# Patient Record
Sex: FEMALE | URBAN - METROPOLITAN AREA
[De-identification: names, ages, dates, MRNs, and addresses within clinical notes are randomized per-mention and may not be internally consistent; named-entity substitution may affect disease eponyms.]

---

## 2020-09-29 ENCOUNTER — OFFICE VISIT (OUTPATIENT)
Dept: URBAN - METROPOLITAN AREA CLINIC 113 | Facility: CLINIC | Age: 85
End: 2020-09-29

## 2020-10-06 ENCOUNTER — OFFICE VISIT (OUTPATIENT)
Dept: URBAN - METROPOLITAN AREA CLINIC 113 | Facility: CLINIC | Age: 85
End: 2020-10-06
Payer: MEDICARE

## 2020-10-06 ENCOUNTER — DASHBOARD ENCOUNTERS (OUTPATIENT)
Age: 85
End: 2020-10-06

## 2020-10-06 VITALS
DIASTOLIC BLOOD PRESSURE: 104 MMHG | TEMPERATURE: 98 F | WEIGHT: 162 LBS | HEART RATE: 86 BPM | SYSTOLIC BLOOD PRESSURE: 184 MMHG | HEIGHT: 66 IN | BODY MASS INDEX: 26.03 KG/M2 | RESPIRATION RATE: 18 BRPM

## 2020-10-06 DIAGNOSIS — R76.8 POSITIVE HEPATITIS C ANTIBODY TEST: ICD-10-CM

## 2020-10-06 PROCEDURE — G8427 DOCREV CUR MEDS BY ELIG CLIN: HCPCS | Performed by: NURSE PRACTITIONER

## 2020-10-06 PROCEDURE — 99203 OFFICE O/P NEW LOW 30 MIN: CPT | Performed by: NURSE PRACTITIONER

## 2020-10-06 RX ORDER — ISOSORBIDE DINITRATE 5 MG/1
1 TABLET TABLET ORAL
Status: ACTIVE | COMMUNITY

## 2020-10-06 RX ORDER — ACETAMINOPHEN 325 MG/1
1 TABLET AS NEEDED TABLET, FILM COATED ORAL
Status: ACTIVE | COMMUNITY

## 2020-10-06 RX ORDER — FUROSEMIDE 80 MG/1
1 TABLET TABLET ORAL ONCE A DAY
Status: ACTIVE | COMMUNITY

## 2020-10-06 RX ORDER — CARVEDILOL 6.25 MG/1
1 TABLET WITH FOOD TABLET, FILM COATED ORAL TWICE A DAY
Status: ACTIVE | COMMUNITY

## 2020-10-06 NOTE — HPI-TODAY'S VISIT:
This is an 85-year-old female with a history of hypertension, diabetes, chronic kidney disease previously on hemodialysis, peripheral vascular disease with chronic ulcers presenting for evaluation of hepatitis C.  She went to the emergency department at [facility] 3 weeks ago.  She states she "felt bad in my head all over."  Her blood pressure was 200.  She declined medication and was discharged.  She was later called and informed that her labs were positive for hepatitis C.  She denies a history of liver enzyme elevation.  She had a blood transfusion in 1969 for a hemoglobin of 5 at the time of a tubal pregnancy requiring hysterectomy.  She occasionally drinks a tablespoon of amaretto in her coffee.  She has a bowel movement every day.  Occasionally, she has to strain and takes mineral oil.  She denies any other abdominal symptoms.  She has labs scheduled with her nephrologist, Dr. Umesh Reaves in [place].  She has routine labs with her primary care physician.She has not taken her blood pressure medication today.

## 2020-10-07 ENCOUNTER — TELEPHONE ENCOUNTER (OUTPATIENT)
Dept: URBAN - METROPOLITAN AREA CLINIC 113 | Facility: CLINIC | Age: 85
End: 2020-10-07

## 2020-10-07 PROBLEM — 128302006: Status: ACTIVE | Noted: 2020-10-07

## 2021-01-12 ENCOUNTER — OFFICE VISIT (OUTPATIENT)
Dept: URBAN - METROPOLITAN AREA CLINIC 113 | Facility: CLINIC | Age: 86
End: 2021-01-12